# Patient Record
Sex: FEMALE | Race: WHITE
[De-identification: names, ages, dates, MRNs, and addresses within clinical notes are randomized per-mention and may not be internally consistent; named-entity substitution may affect disease eponyms.]

---

## 2019-02-15 ENCOUNTER — HOSPITAL ENCOUNTER (EMERGENCY)
Dept: HOSPITAL 92 - ERS | Age: 69
Discharge: HOME | End: 2019-02-15
Payer: MEDICARE

## 2019-02-15 ENCOUNTER — HOSPITAL ENCOUNTER (EMERGENCY)
Dept: HOSPITAL 57 - BURERS | Age: 69
Discharge: ANOTHER HOSPITAL | End: 2019-02-15
Payer: MEDICARE

## 2019-02-15 DIAGNOSIS — Z79.899: ICD-10-CM

## 2019-02-15 DIAGNOSIS — R79.89: ICD-10-CM

## 2019-02-15 DIAGNOSIS — Z79.82: ICD-10-CM

## 2019-02-15 DIAGNOSIS — K80.20: Primary | ICD-10-CM

## 2019-02-15 DIAGNOSIS — K85.10: Primary | ICD-10-CM

## 2019-02-15 DIAGNOSIS — K21.9: ICD-10-CM

## 2019-02-15 LAB
ALBUMIN SERPL BCG-MCNC: 4.2 G/DL (ref 3.4–4.8)
ALP SERPL-CCNC: 92 U/L (ref 40–150)
ALT SERPL W P-5'-P-CCNC: 135 U/L (ref 8–55)
ANION GAP SERPL CALC-SCNC: 15 MMOL/L (ref 10–20)
AST SERPL-CCNC: 202 U/L (ref 5–34)
BASOPHILS # BLD AUTO: 0.1 THOU/UL (ref 0–0.2)
BASOPHILS NFR BLD AUTO: 1.2 % (ref 0–1)
BILIRUB SERPL-MCNC: 1.3 MG/DL (ref 0.2–1.2)
BUN SERPL-MCNC: 16 MG/DL (ref 9.8–20.1)
CALCIUM SERPL-MCNC: 9.9 MG/DL (ref 7.8–10.44)
CHLORIDE SERPL-SCNC: 102 MMOL/L (ref 98–107)
CK SERPL-CCNC: 60 U/L (ref 29–168)
CO2 SERPL-SCNC: 26 MMOL/L (ref 23–31)
CREAT CL PREDICTED SERPL C-G-VRATE: 0 ML/MIN (ref 70–130)
DIGOXIN SERPL-MCNC: 0.66 NG/ML (ref 0.8–2)
EOSINOPHIL # BLD AUTO: 0.2 THOU/UL (ref 0–0.7)
EOSINOPHIL NFR BLD AUTO: 1.6 % (ref 0–10)
GLOBULIN SER CALC-MCNC: 2.9 G/DL (ref 2.4–3.5)
GLUCOSE SERPL-MCNC: 129 MG/DL (ref 80–115)
HGB BLD-MCNC: 14.8 G/DL (ref 12–16)
LYMPHOCYTES # BLD AUTO: 1.2 THOU/UL (ref 1.2–3.4)
LYMPHOCYTES NFR BLD AUTO: 12.3 % (ref 21–51)
MCH RBC QN AUTO: 31 PG (ref 27–31)
MCV RBC AUTO: 88 FL (ref 78–98)
MONOCYTES # BLD AUTO: 0.6 THOU/UL (ref 0.11–0.59)
MONOCYTES NFR BLD AUTO: 5.9 % (ref 0–10)
NEUTROPHILS # BLD AUTO: 7.8 THOU/UL (ref 1.4–6.5)
NEUTROPHILS NFR BLD AUTO: 79 % (ref 42–75)
PLATELET # BLD AUTO: 243 THOU/UL (ref 130–400)
POTASSIUM SERPL-SCNC: 3.8 MMOL/L (ref 3.5–5.1)
RBC # BLD AUTO: 4.74 MILL/UL (ref 4.2–5.4)
SODIUM SERPL-SCNC: 139 MMOL/L (ref 136–145)
WBC # BLD AUTO: 9.8 THOU/UL (ref 4.8–10.8)

## 2019-02-15 PROCEDURE — 36415 COLL VENOUS BLD VENIPUNCTURE: CPT

## 2019-02-15 PROCEDURE — 83690 ASSAY OF LIPASE: CPT

## 2019-02-15 PROCEDURE — 80053 COMPREHEN METABOLIC PANEL: CPT

## 2019-02-15 PROCEDURE — 96374 THER/PROPH/DIAG INJ IV PUSH: CPT

## 2019-02-15 PROCEDURE — 84484 ASSAY OF TROPONIN QUANT: CPT

## 2019-02-15 PROCEDURE — 80162 ASSAY OF DIGOXIN TOTAL: CPT

## 2019-02-15 PROCEDURE — 71045 X-RAY EXAM CHEST 1 VIEW: CPT

## 2019-02-15 PROCEDURE — 76705 ECHO EXAM OF ABDOMEN: CPT

## 2019-02-15 PROCEDURE — 85025 COMPLETE CBC W/AUTO DIFF WBC: CPT

## 2019-02-15 PROCEDURE — 96375 TX/PRO/DX INJ NEW DRUG ADDON: CPT

## 2019-02-15 PROCEDURE — 93005 ELECTROCARDIOGRAM TRACING: CPT

## 2019-02-15 PROCEDURE — 82550 ASSAY OF CK (CPK): CPT

## 2019-02-15 NOTE — ULT
RIGHT UPPER QUADRANT ULTRASOUND:

 

HISTORY:

Right upper quadrant pain, nausea, and vomiting.

 

COMPARISON:

None.

 

TECHNIQUE:

Real-time, gray-scale, and color evaluation of the right upper quadrant of the abdomen is performed.

 

FINDINGS:

The visualized portion of the pancreas is unremarkable.  The hepatic echotexture is normal.  No perih
epatic fluid.

 

There is cholelithiasis.  Sonographic Goss sign is negative.  Gallbladder wall thickness is less th
an 3 mm.

 

The gallbladder is mildly distended.  The portal vein has patent antegrade flow.  The common bile flor
t measures 4 mm.

 

The liver measures 15.9 cm in length.  The right kidney measures 9.9 x 4.3 x 4.1 cm, without mass, hy
dronephrosis, or abnormal calcifications.

 

IMPRESSION:

1.  Mildly distended gallbladder with cholelithiasis.

 

2.  No wall thickening or pericholecystic fluid to suggest acute cholecystitis.

 

POS: [facility]

## 2019-02-15 NOTE — RAD
PORTABLE CHEST:

2/15/19

 

An AP portable film at 0748 is compared with a 7/19/15 study.

 

The heart is normal in size and the lungs are clear. There is no clear congestive change, pleural eff
usion, or focal pulmonary infiltrate. The trachea is midline and the mediastinum appears normal. 

 

IMPRESSION:  

No acute thoracic finding. 

 

POS: [facility]

## 2019-03-12 ENCOUNTER — HOSPITAL ENCOUNTER (OUTPATIENT)
Dept: HOSPITAL 92 - BICRAD | Age: 69
Discharge: HOME | End: 2019-03-12
Attending: INTERNAL MEDICINE
Payer: MEDICARE

## 2019-03-12 DIAGNOSIS — M47.814: ICD-10-CM

## 2019-03-12 DIAGNOSIS — M40.294: Primary | ICD-10-CM

## 2019-03-12 PROCEDURE — 72070 X-RAY EXAM THORAC SPINE 2VWS: CPT

## 2019-03-12 NOTE — RAD
THORACIC SPINE TWO VIEW

3/12/19

 

HISTORY: 

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

 

COMPARISON:  

Chest radiograph 2/15/19.

 

FINDINGS:  

Mild spondylosis in the thoracic spine from T6 to T8 with degenerative disc space height loss and ost
eophyte formation. No fracture. No significant scoliosis. 

 

The posterior ribs are normal. 

 

IMPRESSION:  

Low grade spondylosis. 

 

POS: [facility]

## 2019-03-18 ENCOUNTER — HOSPITAL ENCOUNTER (OUTPATIENT)
Dept: HOSPITAL 92 - BICMAMMO | Age: 69
Discharge: HOME | End: 2019-03-18
Attending: INTERNAL MEDICINE
Payer: MEDICARE

## 2019-03-18 DIAGNOSIS — M85.89: ICD-10-CM

## 2019-03-18 DIAGNOSIS — M81.0: Primary | ICD-10-CM

## 2019-03-18 PROCEDURE — 77080 DXA BONE DENSITY AXIAL: CPT

## 2019-03-18 NOTE — BD
BONE DENSITOMETRY USING DEXA:

 

Date:  03/18/19 

 

HISTORY:  

Postmenopausal screening for osteoporosis. 

 

FINDINGS:

 

Lumbar Spine:       BMD (g/cm2)

    L1              0.960         T-Score:  -0.3   Z-Score:  1.6 

    L2              0.959         T-Score:  -0.6   Z-Score:  1.4 

    L3              0.948         T-Score:  -1.2   Z-Score:  0.9 

    L4              1.080         T-Score:  0.2    Z-Score:  2.4 

    L1-L4           0.989         T-Score:  -0.5   Z-Score:  1.6 

 

Femoral Neck:       0.715         T-Score:  -1.2   Z-Score:  0.6 

Total Femur:        0.985         T-Score:  0.4    Z-Score:  1.8 

 

The 10 year fracture risk for a major osteoporotic fracture is 14% and for a hip fracture is 1.8%. 

 

IMPRESSION: 

Osteopenia. 

 

 

POS: [facility]

## 2019-08-30 ENCOUNTER — HOSPITAL ENCOUNTER (OUTPATIENT)
Dept: HOSPITAL 92 - SDC | Age: 69
End: 2019-08-30
Attending: SPECIALIST
Payer: MEDICARE

## 2019-08-30 VITALS — BODY MASS INDEX: 29.7 KG/M2

## 2019-08-30 DIAGNOSIS — Z79.82: ICD-10-CM

## 2019-08-30 DIAGNOSIS — Z79.899: ICD-10-CM

## 2019-08-30 DIAGNOSIS — K80.10: Primary | ICD-10-CM

## 2019-08-30 DIAGNOSIS — M85.80: ICD-10-CM

## 2019-08-30 DIAGNOSIS — G47.30: ICD-10-CM

## 2019-08-30 DIAGNOSIS — Z88.0: ICD-10-CM

## 2019-08-30 DIAGNOSIS — K21.9: ICD-10-CM

## 2019-08-30 LAB
ALBUMIN SERPL BCG-MCNC: 4.3 G/DL (ref 3.4–4.8)
ALP SERPL-CCNC: 86 U/L (ref 40–150)
ALT SERPL W P-5'-P-CCNC: 23 U/L (ref 8–55)
ANION GAP SERPL CALC-SCNC: 11 MMOL/L (ref 10–20)
AST SERPL-CCNC: 18 U/L (ref 5–34)
BASOPHILS # BLD AUTO: 0.1 THOU/UL (ref 0–0.2)
BASOPHILS NFR BLD AUTO: 0.9 % (ref 0–1)
BILIRUB SERPL-MCNC: 0.6 MG/DL (ref 0.2–1.2)
BUN SERPL-MCNC: 13 MG/DL (ref 9.8–20.1)
CALCIUM SERPL-MCNC: 9.7 MG/DL (ref 7.8–10.44)
CHLORIDE SERPL-SCNC: 103 MMOL/L (ref 98–107)
CO2 SERPL-SCNC: 28 MMOL/L (ref 23–31)
CREAT CL PREDICTED SERPL C-G-VRATE: 71 ML/MIN (ref 70–130)
EOSINOPHIL # BLD AUTO: 0.3 THOU/UL (ref 0–0.7)
EOSINOPHIL NFR BLD AUTO: 3.7 % (ref 0–10)
GLOBULIN SER CALC-MCNC: 2.7 G/DL (ref 2.4–3.5)
GLUCOSE SERPL-MCNC: 98 MG/DL (ref 80–115)
HGB BLD-MCNC: 14.1 G/DL (ref 12–16)
LYMPHOCYTES # BLD: 1.8 THOU/UL (ref 1.2–3.4)
LYMPHOCYTES NFR BLD AUTO: 26.5 % (ref 21–51)
MCH RBC QN AUTO: 31.6 PG (ref 27–31)
MCV RBC AUTO: 90.6 FL (ref 78–98)
MONOCYTES # BLD AUTO: 0.5 THOU/UL (ref 0.11–0.59)
MONOCYTES NFR BLD AUTO: 6.8 % (ref 0–10)
NEUTROPHILS # BLD AUTO: 4.3 THOU/UL (ref 1.4–6.5)
NEUTROPHILS NFR BLD AUTO: 62.1 % (ref 42–75)
PLATELET # BLD AUTO: 250 THOU/UL (ref 130–400)
POTASSIUM SERPL-SCNC: 4.2 MMOL/L (ref 3.5–5.1)
RBC # BLD AUTO: 4.47 MILL/UL (ref 4.2–5.4)
SODIUM SERPL-SCNC: 138 MMOL/L (ref 136–145)
WBC # BLD AUTO: 6.9 THOU/UL (ref 4.8–10.8)

## 2019-08-30 PROCEDURE — 0FT44ZZ RESECTION OF GALLBLADDER, PERCUTANEOUS ENDOSCOPIC APPROACH: ICD-10-PCS | Performed by: SPECIALIST

## 2019-08-30 PROCEDURE — 80053 COMPREHEN METABOLIC PANEL: CPT

## 2019-08-30 PROCEDURE — 93005 ELECTROCARDIOGRAM TRACING: CPT

## 2019-08-30 PROCEDURE — 36415 COLL VENOUS BLD VENIPUNCTURE: CPT

## 2019-08-30 PROCEDURE — 88304 TISSUE EXAM BY PATHOLOGIST: CPT

## 2019-08-30 PROCEDURE — 71046 X-RAY EXAM CHEST 2 VIEWS: CPT

## 2019-08-30 PROCEDURE — 85025 COMPLETE CBC W/AUTO DIFF WBC: CPT

## 2019-08-30 PROCEDURE — 93010 ELECTROCARDIOGRAM REPORT: CPT

## 2019-08-30 NOTE — RAD
RADIOGRAPH CHEST 2 VIEWS:



DATE:

8/30/2019



HISTORY:

69-year-old female for preoperative clearance



FINDINGS:

There is no airspace density, pulmonary edema, pleural effusion, pneumothorax, or cardiomegaly.



IMPRESSION:

No acute cardiopulmonary findings.



Reported By: Deo Mcdonald 

Electronically Signed:  8/30/2019 11:43 AM

## 2019-09-01 NOTE — OP
DATE OF PROCEDURE:  08/30/2019



PREOPERATIVE DIAGNOSIS:  Symptomatic cholelithiasis.



POSTOPERATIVE DIAGNOSIS:  Symptomatic cholelithiasis.



PROCEDURE PERFORMED:  Laparoscopic cholecystectomy.



ANESTHESIA:  General endotracheal.



INDICATIONS:  The patient is a 69-year-old white female, who presents with symptoms

referable to gallbladder and ultrasound-proven cholelithiasis.  She is taken to the

operating room at this time for laparoscopic cholecystectomy. 



DESCRIPTION OF OPERATION:  Informed consent was obtained.  The patient was taken to

the operating room where general endotracheal anesthesia was obtained with the

patient in the supine position.  The abdomen was prepped with Betadine and draped in

the usual sterile fashion.  0.25% Marcaine with epinephrine was infiltrated below

the umbilicus and a 10 mm infraumbilical incision was created.  A Veress needle was

passed through this incision into the peritoneal cavity.  A pneumoperitoneum was

established using carbon dioxide up to a pressure of 15 mmHg.  Local anesthetic was

infiltrated and 3 additional 5 mm right upper quadrant incisions were created.

Through the mid incision, a 5 mm port was passed into the peritoneal cavity.  The

camera was passed through this port and under direct vision, an 11 port was passed

through the infraumbilical incision.  The camera was replaced through this port, and

under direct vision, 2 additional 5 mm ports were passed through the incisions

already created. 



The gallbladder was grasped and retracted in a cephalad direction.  Minimal

adhesions were bluntly stripped away from the apex of the gallbladder, and the apex

was retracted laterally and inferiorly.  Careful dissection was carried out to the

apex of the gallbladder to identify the cystic duct and cystic artery.  These were

each carefully dissected circumferentially.  The duct was of normal caliber.  Both

the duct and the artery were divided between clips, leaving 2 on the side to remain

within the abdomen.  The gallbladder was then dissected out of the gallbladder fossa

using electrocautery and removed through the infraumbilical port site.  The fascia

was closed with 0 Vicryl suture and a GraNee needle.  The right upper quadrant was

inspected and irrigated.  All irrigant was aspirated.  All ports and instruments

were removed under direct vision.  Pneumoperitoneum was carefully evacuated.

Additional local anesthetic was infiltrated into each port site.  The skin edges

were approximated with 4-0 Monocryl subcuticular sutures, and Dermabond was placed

externally.  There were no complications.  The patient tolerated the procedure well

and was taken to the recovery room in stable condition. 



FINDINGS:  Although the patient had a healed infraumbilical incision, there were no

adhesions at all to the anterior abdominal wall.  The gallbladder was without

adhesions to surrounding structures.  The duct was small and noninflamed.

Cholangiogram was not obtained.  There were several gallstones recognized within the

gallbladder.  There were no complications.  The patient tolerated the procedure

well, was taken to recovery room in stable condition. 







Job ID:  [id number]

## 2020-01-22 ENCOUNTER — HOSPITAL ENCOUNTER (OUTPATIENT)
Dept: HOSPITAL 92 - SCSULT | Age: 70
Discharge: HOME | End: 2020-01-22
Attending: FAMILY MEDICINE
Payer: MEDICARE

## 2020-01-22 DIAGNOSIS — M79.662: Primary | ICD-10-CM

## 2020-01-22 NOTE — ULT
EXAM:

Left lower extremity venous Doppler



HISTORY:

Left calf pain



FINDINGS:

Grayscale, color-flow, Doppler evaluation, spectral analysis of the left lower extremity venous struc
tures is performed with 2-D imaging. The left common femoral, superficial femoral, popliteal,

posterior tibial, proximal greater saphenous and profunda femoral veins are imaged.



There is normal luminal compressibility, flow, and augmentation in the visualized deep venous structu
res of the left lower extremity.



IMPRESSION:

No evidence of a deep vein thrombosis in the visualized deep venous structures left lower extremity.



Reported By: Oleg Corado 

Electronically Signed:  1/22/2020 1:34 PM

## 2020-01-27 ENCOUNTER — HOSPITAL ENCOUNTER (OUTPATIENT)
Dept: HOSPITAL 92 - SCSRAD | Age: 70
Discharge: HOME | End: 2020-01-27
Attending: FAMILY MEDICINE
Payer: MEDICARE

## 2020-01-27 DIAGNOSIS — M25.552: Primary | ICD-10-CM

## 2020-01-27 DIAGNOSIS — M16.11: ICD-10-CM

## 2020-01-27 NOTE — RAD
LEFT HIP TWO VIEWS:

 

History: Severe left hip pain. 

 

Comparison: None. 

 

FINDINGS: 

No acute fracture, subluxation evident. There is moderate chronic ostitis. There is post-operative ch
sandeep involving the lower pelvis. 

 

IMPRESSION: 

No acute osseous abnormality. 

 

POS: [facility]

## 2020-01-27 NOTE — RAD
Right hip 2 views



HISTORY: Hip pain.



FINDINGS: Mild joint space narrowing, osteophytosis, and subchondral sclerosis. Femoral head contour 
is maintained. No acute fracture, dislocation, or aggressive osseous erosions.



IMPRESSION: Mild osteoarthritic changes right hip.



Reported By: REBECCA Varela 

Electronically Signed:  1/27/2020 4:23 PM

## 2020-01-30 ENCOUNTER — HOSPITAL ENCOUNTER (OUTPATIENT)
Dept: HOSPITAL 92 - BICRAD | Age: 70
Discharge: HOME | End: 2020-01-30
Attending: INTERNAL MEDICINE
Payer: MEDICARE

## 2020-01-30 DIAGNOSIS — M51.16: ICD-10-CM

## 2020-01-30 DIAGNOSIS — M47.26: ICD-10-CM

## 2020-01-30 DIAGNOSIS — M47.27: Primary | ICD-10-CM

## 2020-01-30 PROCEDURE — 72100 X-RAY EXAM L-S SPINE 2/3 VWS: CPT

## 2020-01-30 NOTE — RAD
LUMBAR SPINE RADIOGRAPHS TWO VIEWS:

 

01/30/2020

 

PROVIDED CLINICAL HISTORY:

Lumbar radiculopathy.

 

FINDINGS:

Five non-rib-bearing lumbar-type vertebral bodies are present. Lumbar alignment appears normal. Verte
bral body heights are preserved. Diffuse loss of disk space height with endplate degenerative change,
 most conspicuous at L4-L5. Vacuum disk phenomena are also noted at multiple levels. Lower lumbar spi
ne facet arthritis changes are seen. Surgical clips are noted in the right upper quadrant.

 

IMPRESSION:

Advanced multilevel lumbar disk and facet degenerative change.

 

POS: [facility]

## 2020-03-19 ENCOUNTER — HOSPITAL ENCOUNTER (OUTPATIENT)
Dept: HOSPITAL 92 - TBSIIMAG | Age: 70
Discharge: HOME | End: 2020-03-19
Attending: ANESTHESIOLOGY
Payer: MEDICARE

## 2020-03-19 DIAGNOSIS — M47.26: ICD-10-CM

## 2020-03-19 DIAGNOSIS — M51.16: Primary | ICD-10-CM

## 2020-03-19 PROCEDURE — 72148 MRI LUMBAR SPINE W/O DYE: CPT

## 2020-03-19 NOTE — MRI
MRI LUMBAR SPINE WITHOUT CONTRAST:

 

HISTORY: 

Back and left hip pain.  Associated numbness and tingling in the left foot and calf.

 

FINDINGS: 

Slightly heterogeneous marrow signal intensity of the T1 weighted imaging likely representing senesce
nt change.  Lumbar spine vertebral body heights are maintained.  There is no fracture.  Schmorl's nod
e along the inferior end plate of T10, superior end plate of T12.  Additional Schmorl's node noted al
robbie the inferior end plate of L1.  There are T2 Modic changes at the posterior L4-L5 disk space.

 

Appropriate signal intensity in the visualized paraspinal muscles and solid organs.  Right extrarenal
 pelvis is noted.

 

Conus medullaris terminates at the mid L1 level.

 

T10-T11:  Broad-based disk bulge with mild central canal stenosis.  Moderate bilateral neural foramin
al narrowing.

 

T11-T12:  Broad-based disk bulge without significant central canal stenosis.  Mild bilateral neural f
oraminal narrowing.

 

T12-L1:  Desiccation with mild loss of disk space height.  No significant central canal stenosis.  Mi
ld bilateral neural foraminal narrowing.

 

L1-L2:  Desiccation with moderate loss of disk space height.  Left and right paracentral disk bulges 
result in mild central canal stenosis.  Mild to moderate bilateral neural foraminal narrowing.

 

L2-L3:  Desiccation with severe loss of disk space height.  Broad-based disk bulge, ligamentum flavum
 thickening, and facet hypertrophy result in mild central canal stenosis.  Right neural foramen is pa
tent.  Mild to moderate left neural foraminal narrowing.

 

L3-L4:  Desiccation with severe loss of disk space height.  Broad-based disk bulge.  Disk material en
croaches upon both subarticular zones.  There is mass effect and partial obscuration of bilateral tra
versing L4 nerve roots.  Mild central canal stenosis.  Mild right and mild to moderate left neural fo
raminal narrowing.

 

L4-L5:  Desiccation with severe loss of disk space height.  Broad-based disk bulge minimally flattens
 the ventral thecal sac.  There is no significant central canal stenosis.  Moderate bilateral neural 
foraminal narrowing.

 

L5-S1:  Desiccation with mild loss of disk space height.  Central disk herniation.  There is encroach
ment upon both subarticular zones.  There is contact upon both S1 nerve roots without significant mas
s effect or obscuration.  Mild to moderate bilateral neural foraminal narrowing.  There is a small am
ount of fluid in both facet joints.

 

IMPRESSION: 

Multilevel degenerative changes of the lumbar spine as detailed above.

 

POS: [facility]

## 2020-06-24 ENCOUNTER — HOSPITAL ENCOUNTER (OUTPATIENT)
Dept: HOSPITAL 92 - BICMAMMO | Age: 70
Discharge: HOME | End: 2020-06-24
Attending: OBSTETRICS & GYNECOLOGY
Payer: MEDICARE

## 2020-06-24 DIAGNOSIS — Z91.89: ICD-10-CM

## 2020-06-24 DIAGNOSIS — Z12.31: Primary | ICD-10-CM

## 2020-06-24 PROCEDURE — 77063 BREAST TOMOSYNTHESIS BI: CPT

## 2020-06-24 PROCEDURE — 77067 SCR MAMMO BI INCL CAD: CPT

## 2020-06-24 NOTE — MMO
Bilateral MAMMO Bilat Screen [facility].

 

CLINICAL HISTORY:

Patient is 69 years old and is seen for screening. The patient has no family

history of breast cancer.  The patient has no personal history of cancer. The

patient has a history of right needle biopsy in 2000 - benign.

 

VIEWS:

The views performed were:  bilateral craniocaudal with tomosynthesis and

bilateral mediolateral oblique with tomosynthesis.

 

FILMS COMPARED:

The present examination has been compared to prior imaging studies performed at

[facility] on 09/29/2014, 01/07/2016, 01/10/2017 and 01/15/2019.

 

This study has been interpreted with the assistance of computer-aided detection.

 

MAMMOGRAM FINDINGS:

The breasts are heterogeneously dense, which could obscure a lesion on

mammography.

 

There are stable benign appearing calcifications seen in both breasts.

 

There are no suspicious masses, suspicious calcifications, or new areas of

architectural distortion.

 

IMPRESSION:

THERE IS NO MAMMOGRAPHIC EVIDENCE OF MALIGNANCY.

 

A ROUTINE FOLLOW-UP MAMMOGRAM IN 1 YEAR IS RECOMMENDED.

 

THE RESULTS OF THIS EXAM WERE SENT TO THE PATIENT.

 

ACR BI-RADS Category 2 - Benign finding

 

MAMMOGRAPHY NOTE:

 1. A negative mammogram report should not delay a biopsy if a dominant of

 clinically suspicious mass is present.

 2. Approximately 10% to 15% of breast cancers are not detected by

 mammography.

 3. Adenosis and dense breasts may obscure an underlying neoplasm.

 

 

Reported by: GLENROY POLLARD MD

Electonically Signed: [id number]